# Patient Record
Sex: FEMALE | Race: WHITE
[De-identification: names, ages, dates, MRNs, and addresses within clinical notes are randomized per-mention and may not be internally consistent; named-entity substitution may affect disease eponyms.]

---

## 2019-02-22 ENCOUNTER — HOSPITAL ENCOUNTER (OUTPATIENT)
Dept: HOSPITAL 14 - H.OPSURG | Age: 59
Discharge: HOME | End: 2019-02-22
Attending: PODIATRIST
Payer: COMMERCIAL

## 2019-02-22 VITALS
OXYGEN SATURATION: 97 % | RESPIRATION RATE: 20 BRPM | HEART RATE: 87 BPM | TEMPERATURE: 98.1 F | SYSTOLIC BLOOD PRESSURE: 145 MMHG | DIASTOLIC BLOOD PRESSURE: 65 MMHG

## 2019-02-22 VITALS — BODY MASS INDEX: 26.2 KG/M2

## 2019-02-22 DIAGNOSIS — M20.12: Primary | ICD-10-CM

## 2019-02-22 DIAGNOSIS — E78.5: ICD-10-CM

## 2019-02-22 DIAGNOSIS — I10: ICD-10-CM

## 2019-02-22 DIAGNOSIS — M19.90: ICD-10-CM

## 2019-02-22 PROCEDURE — 88304 TISSUE EXAM BY PATHOLOGIST: CPT

## 2019-02-22 PROCEDURE — 28308 INCISION OF METATARSAL: CPT

## 2019-02-22 PROCEDURE — 97161 PT EVAL LOW COMPLEX 20 MIN: CPT

## 2019-02-22 PROCEDURE — 73630 X-RAY EXAM OF FOOT: CPT

## 2019-02-22 PROCEDURE — 28309 INCISION OF METATARSALS: CPT

## 2019-02-22 PROCEDURE — 88311 DECALCIFY TISSUE: CPT

## 2019-02-22 RX ADMIN — SODIUM CHLORIDE ONE ML: 9 INJECTION, SOLUTION INTRAVENOUS at 12:17

## 2019-02-22 RX ADMIN — SODIUM CHLORIDE ONE ML: 9 INJECTION, SOLUTION INTRAVENOUS at 10:05

## 2019-02-22 NOTE — CP.PCM.PN
Subjective





- Date & Time of Evaluation


Date of Evaluation: 02/22/19


Time of Evaluation: 07:57





- Subjective


Subjective: 


Podiatry progress note for Dr. Talley, 





57 y/o female patient with no significant PMHx was seen and evaluated in [facility] [place] for

Left foot surgery. Patient states her bunion has been present for many years, 

and has been painful for the past 2-3 years. Patient states all conservative 

treatment failed and hence she decided to go for a surgical procedure at this 

time. Patient confirms she last ate at 8:00 PM last night. Patient denies any 

adverse reactions to anesthesia in the past. 





Patient's medical and cardiac clearance are noted to be in the chart. 





PMHx: borderline HTN


Medications: Vitamins, Centrum


Allergies: Iron


Social History: denies alcohol or drug use








Objective





- Constitutional


Appears: Well, Non-toxic, No Acute Distress





- Head Exam


Head Exam: ATRAUMATIC, NORMOCEPHALIC





- Extremities Exam


Additional comments: 


Bilateral Lower Extremity Exam





VASC: DP and PT 2/4 bilaterally, CFT less than 3 seconds X 10, no edema, TG 

within normal limits, varicosities noted





NEURO: epicritic and protective sensation intact





DERM: erythema noted at the dorso-medial eminence, no open lesions, no clinical 

signs of infection





ORTHO: hallux abducto valgus of the left foot noted with prominent dorso-medial 

eminence, pain on palpation and with range of motion of the 1st MTPJ, pain with 

range of motion of the 2nd MTPJ, plantarflexed 2nd metatarsal  








- Neurological Exam


Neurological Exam: Alert, Awake, Oriented x3





- Psychiatric Exam


Psychiatric exam: Normal Affect, Normal Mood





Assessment and Plan





- Assessment and Plan (Free Text)


Assessment: 


57 y/o female seen and evaluated in [facility] [place] for L foot surgery





Plan: 


Pt was seen and examined in [facility] [place] 


Pt NPO status was confirmed


All pre-op testing and clearance in chart


Pt has exhausted all conservative treatment at this time and is opting for 

surgical intervention


Pt was explained procedure and post-operative course


All pt's questions were answered to satisfaction


No guarantees were made


Pt understands all risks, benefits and complications of procedure


Pt will follow-up with Dr. Carmen Talley within 1 week of surgery

## 2019-02-22 NOTE — CP.SDSHP
Same Day Surgery [facility]





- History


Proposed Procedure: Left foot 1st metatarsal osteotomy with 2nd metatarsal 

shortening with screw fixation


Pre-Op Diagnosis: Left foot Hallux Abducto Valgus Deformity





- Previous Medical/Surgical History


Pain: 4.Moderate Pain





- Allergies


Allergies: 


Allergies





iron Allergy (Verified 02/22/19 07:35)


   PALPITATIONS











- Physical Exam


Mental Status: Alert & Oriented x3


Neuro: WNL





- {Optional Preform as Required}


Integument: WNL





- Impression


Pt. Evaluated Today:Candidate for Anesthesia & Procedure: Yes





- Date & Time


Date: 02/22/19


Time: 07:57





Short Stay Discharge





- Short Stay Discharge


Admitting Diagnosis/Reason for Visit: M20.12


Disposition: HOME/ ROUTINE


Additional Instructions (Diet, Activity): 


-Patient in good/stable condition for discharge home


-Pt to resume medications per medical reconciliation


-Resume regular diet


-Please keep dressing clean, dry, & intact to surgical site


-Use plastic bag over bandage for showering


-Wear post op shoe at all times when ambulating


-Call clinic if you see signs of infection (redness, swelling, malodor)


-Please make an appointment to see Dr. Talley in office/clinic within 1 week for 

post-op check





Progress Note/Discharge Note with Instructions: 


- Patient evaluated bedside in recovery s/p L foot surgery


- After surgical procedure patient in NAD


- (+) Void, (+) Appetite


- Capillary refill time <3s and NVS intact.


- Patient denies complaints at this time.


- Post operative instructions and plan of care explained to patient at length.


- Patient. acknowledges verbal understanding.


- Patient stable for DC per podiatric surgery

## 2019-02-22 NOTE — RAD
Date of service: 



02/22/2019



PROCEDURE:  Left Foot Radiographs.



HISTORY:

 s/p left foot surgery 



COMPARISON:

None.



FINDINGS:



BONES:

Status post osteotomy distal 1st and 2nd metatarsal.  Status post 

bunionectomy.  No acute fracture. 



JOINTS:

Normal. 



SOFT TISSUES:

Normal. 



OTHER FINDINGS:

None.



IMPRESSION:

Osteotomy distal 1st and 2nd metatarsal.  Bunionectomy.  Otherwise 

unremarkable.

## 2019-02-22 NOTE — PCM.SURG1
Surgeon's Initial Post Op Note





- Surgeon's Notes


Surgeon: Dr. Talley DPM


Assistant: Dr. Ashton PGY3 Dr. Foster PGY1


Type of Anesthesia: General IV, Local


Anesthesia Administered By: Melia


Pre-Operative Diagnosis: Left foot hallux abductovalgus deformity, elongated 

second metatarsal


Operative Findings: see dictation.  20 cc 0.5% marcaine plain preop, 10cc intra 

op, 1cc dex phos intraop.  1 2.0 16mm screw, 1 1.5 14mm screw, 1 1.5 10mm screw


Post-Operative Diagnosis: same


Operation Performed: left hallux bunionectomy, second metatarsal weil osteomtomy

with screw fixation


Specimen/Specimens Removed: second metatarsal, first metatarsal


Estimated Blood Loss: EBL {In ML}: 5


Blood Products Given: N/A


Drains Used: No Drains


Post-Op Condition: Good


Date of Surgery/Procedure: 02/22/19


Time of Surgery/Procedure: 12:36

## 2019-02-25 NOTE — OP
PROCEDURE DATE:  02/22/2019



SURGEON:  Gaby Talley DPM



ASSISTANT:  Zulay Ashton DPM, PGY-3; Simon Foster DPM, PGY-1.



ANESTHETIST:  Jeannie Cárdenas MD.



ANESTHESIA:  General with local.



PREOPERATIVE DIAGNOSES:

1.  Painful hallux abductovalgus deformity of the left foot.

2.  Painful elongated and plantarflexed second metatarsal of the left foot.



PROCEDURE:

1.  Abdiel bunionectomy with screw fixation, left foot.

2.  Weil shortening osteotomy of the second metatarsal with screw fixation,

left foot.



INDICATIONS:  The patient is a 58-year-old female with the above mentioned

diagnoses.  The patient has exhausted all conservative treatment measures

at this time and now is requesting surgical intervention.  The patient

signed the consent after careful explanation of all risks, benefits,

complications, and alternatives for surgical procedure.  No guarantees were

given nor implied.  Then, 2 g of Ancef IV was given to the patient prior to

the procedure and NPO status was confirmed prior to taking the patient to

the operating room.



PREPARATION:  The patient was brought to the operating room and placed on

the operating room table in a supine position.  A well-padded pneumatic

ankle tourniquet was placed to the patient's left ankle.  After induction

of IV sedation, the patient received a total of 20 mL of 0.5% Marcaine

plain in a local block fashion to the patient's left foot.  Once local

anesthesia was achieved, the left foot was then prepped and draped in the

usual sterile manner and the procedure was began.  Esmarch bandage was

utilized to exsanguinate the patient's left foot.  The tourniquet was then

inflated to 250 mmHg at this time.



DESCRIPTION OF PROCEDURE:

1.  Abdiel bunionectomy with screw fixation on the left foot.  Our

attention was now directed to the dorsal aspect of the first metatarsal

head of the patient's left foot where an approximately 5-cm linear

longitudinal incision was made medial and parallel to the tendon of the

extensor hallucis longus and involved the contour of the deformity.  The

incision was now deepened through the subcutaneous tissues using sharp and

blunt dissection.  Care was taken to identify and retract all vital

neurovascular structures.  All bleeders were cauterized and ligated as

necessary.  At this time, an inverted L-type capsulotomy was performed over

the dorsal aspect of the first metatarsophalangeal joint.  The periosteal

and capsular structures were then carefully dissected free of their osseous

attachments and reflected medially and laterally, thus exposing the head of

the first metatarsal into the operative site.  Next, utilizing an

oscillating bone saw, the dorsal and medial prominences were resected and

passed from the operative field.  All roughened edges were then smoothed

down at this time.  Attention was then re-directed to the medial aspect of

the first metatarsal head where a through-and-through V-type osteotomy was

created in the metaphyseal region of the bone utilizing an oscillating bone

saw.  The apex of the osteotomy pointed distally with the arms pointing

proximal plantarly and proximal dorsally.  The dorsal arm was made longer

to accommodate for internal fixation.  Upon completion of the osteotomy,

the capital fragment was distracted and shifted laterally into a more

correct anatomic position and impacted upon the first metatarsal shaft.  At

this time, two 0.045 inch K-wires were driven from dorsal to plantar across

the osteotomy site to serve as a temporary fixation.  Following sequential

removal of the K-wires and following standard AO principles and techniques,

a Synthes 2.0 x 16 mm cortical screw was inserted and placed across the

osteotomy site with excellent compression achieved.  Next, a 0.045 inch

K-wire was then inserted across the osteotomy site on the lateral aspect of

the osteotomy and a 1.3 OrthoSorb pin was then placed across the drill hole

made by the K-wire once the K-wire was removed.  There appeared to be

excellent compression across the osteotomy site at this time with excellent

correction of the deformity noted.  The wound was then flushed with copious

amounts of sterile normal saline solution.  The periosteal and capsular

structures were then reapproximated and coapted using 2-0 and 3-0 Vicryl. 

The subcuticular tissue was reapproximated using 4-0 Vicryl and the skin

was reapproximated using 4-0 Monocryl suture in a continuous running suture

technique.



PROCEDURE #2:  Weil shortening osteotomy of the second metatarsal with

screw fixation of the left foot.



Our attention was now drawn to the second metatarsophalangeal joint.  A

linear incision was created just lateral to the second metatarsophalangeal

joint.  This was done to avoid postoperative contracture overlying the

second MTP joint and for minimal scar formation clinically.  The incision

was carried down to deeper plains paying careful attention to all

neurovascular structures that were retracted, ligated, and cauterized as

necessary.  The incision was deepened down to the level of the long

extensor tendon which was identified and retracted laterally.  Attention

was then drawn to the capsular and periosteal tissue and a sharp periosteal

and capsular incisions were made down to bone with taking care not to  score

the articular cartilage at the second metatarsal head.  The periosteal

tissue was sharply reflected from this area to expose the head and neck of

the second metatarsophalangeal joint.  At this time with the use a sagittal

saw, an osteotomy was made oriented dorsal distal to plantar proximal with

care being taken to make the cut in a parallel fashion  to the

weightbearing surface.  An oblique cut was then made in this orientation

from dorsal distal to proximal plantar without going completely through at

this time.  An additional osteotomy was made on the same orientation

approximately 3 mm proximal to this cut allowing us to remove a wedge of

bone at this time.  The osteotomy was then completed through-and-through

and the capital fragment was then transposed proximally to shorten the

metatarsal at this time.  It was temporarily fixated with two 0.045 inch

K-wires and oriented in a parallel fashion across the osteotomy site. 

Following sequential removal of the K-wires, two Synthes cortical screws

measuring 1.5 x 40 mm and the other measuring 1.5 x 10 mm were then

inserted and placed using standard AO principles and techniques. The surgical 
site was flushed

with copious amounts of sterile normal saline solution.  The capsular

tissue was reapproximated using 2-0 Vicryl.  The subcuticular tissue was

reapproximated using 4-0 Vicryl and the skin edges were reapproximated

using 4-0 Monocryl in a running continuous suture technique.  The incisions

were then injected with an additional 10 mL of 0.5% Marcaine plain. 

Steri-strips were applied to the incision site as well as Betadine-soaked

Adaptic, 4x4 gauze, and a Renato.  The foot was then dressed with a Keenan

compressive dressing consisting of Webril cast padding and Ace bandage.



POSTOPERATIVE CONDITION:  The patient tolerated the procedure and the

anesthesia well with no apparent complications or complaints.  The patient

was escorted from the OR to the recovery room with vital signs stable and

neurovascular structures intact to the patient's left foot.  The patient

will be on weightbearing to the heel with clutalexis.  The patient will

follow up in the office with Dr. Talley within one week.





__________________________________________

Zulay Ashton DPM





__________________________________________

Carmen Talley DPM





DD:  02/24/2019 12:26:36

DT:  02/24/2019 15:52:13

Job # [id number]

[id number]

## 2019-03-08 ENCOUNTER — HOSPITAL ENCOUNTER (OUTPATIENT)
Dept: HOSPITAL 31 - C.SDS | Age: 59
Discharge: HOME | End: 2019-03-08
Payer: COMMERCIAL

## 2019-03-08 VITALS — BODY MASS INDEX: 25.7 KG/M2

## 2019-03-08 VITALS
TEMPERATURE: 98 F | RESPIRATION RATE: 18 BRPM | SYSTOLIC BLOOD PRESSURE: 157 MMHG | HEART RATE: 70 BPM | DIASTOLIC BLOOD PRESSURE: 72 MMHG

## 2019-03-08 VITALS — OXYGEN SATURATION: 100 %

## 2019-03-08 DIAGNOSIS — T84.293A: Primary | ICD-10-CM

## 2019-03-08 PROCEDURE — 28308 INCISION OF METATARSAL: CPT

## 2019-03-08 PROCEDURE — 20680 REMOVAL OF IMPLANT DEEP: CPT

## 2019-03-08 NOTE — PCM.SURG1
Surgeon's Initial Post Op Note





- Surgeon's Notes


Surgeon: Dr. Oly Macdonald DPM


Assistant: Dr. ANNALISE Shepard DPM PGY-3


Type of Anesthesia: IV Sedation, Local


Anesthesia Administered By: Dr. Gasca


Pre-Operative Diagnosis: failure of hardware with displacement of osteotomy 1st 

metatarsal Left foot


Operative Findings: see operative note


Post-Operative Diagnosis: same


Operation Performed: 1) removal hardware 1st metatarsal left foot.  2) open 

reduction with internal fixation of capital fragment 1st metatarsal L foot


Specimen/Specimens Removed: screw 1st met left foot


Estimated Blood Loss: EBL {In ML}: 2


Blood Products Given: N/A


Drains Used: No Drains


Post-Op Condition: Good


Date of Surgery/Procedure: 03/08/19


Time of Surgery/Procedure: 09:00

## 2019-03-11 NOTE — OP
PROCEDURE DATE:  03/08/2019



SURGEON:  Oly Macdonald DPM



ASSISTANT:  Gloria Shepard DPM, PGY-3



ANESTHETIST:  Kenisha Lainez MD



ANESTHESIA:  IV with local.



PREOPERATIVE DIAGNOSIS:  Failure of hardware with displacement of

osteotomy, first metatarsal left foot.



POSTOPERATIVE DIAGNOSIS:  Failure of hardware with displacement of

osteotomy, first metatarsal left foot.



PROCEDURES:

1.  Removal of hardware, first metatarsal left foot.

2.  Open reduction with internal fixation of capital fragment, first

metatarsal left foot.



INDICATIONS:  The patient is a 58-year-old female with the above-mentioned

diagnosis.  Of note, the patient did have a bunion surgery of the left foot

two weeks prior to today's date; however, subsequent radiographs in the

office have shown displacement of the hardware at the osteotomy site.  The

decision was made to bring the patient back to the operating room to repair

the fixation.  The patient has exhausted all conservative treatment

measures now and requires surgical intervention.  The patient signed the

consent after careful explanation of all risks, benefits, complications,

and alternatives for surgical procedure.  No guarantees were given nor

implied.  N.p.o. status was confirmed prior to taking the patient into the

operating room.



PREPARATION:  The patient was brought into the operating room and placed on

the operating room table in a supine position.  A well-padded pneumatic

ankle tourniquet was placed to the patient's left ankle with plenty of

Webril cast padding.  After induction of IV sedation, the patient received

a total of 20 mL of 1:1 mixture consisting of 0.25% Marcaine plain with 1%

lidocaine plain in local block fashion to the patient's left foot.  Once

local anesthesia was achieved, the left foot was then prepped and draped in

the usual manner.  Esmarch bandage was utilized to exsanguinate the

patient's left foot, and the tourniquet was then inflated to 250 mmHg and

the procedure began.



PROCEDURE #1:  Removal of hardware, first metatarsal left foot:  Our

attention was directed to the patient's dorsal medial aspect of the left

foot over the first metatarsophalangeal joint where a previous local

well-coapted incision could be appreciated.  A #15 blade was used to remove

all the Monocryl sutures from the incision site.  Next, the tissue layers

were carefully dissected taking care to avoid all vital neurovascular

structures.  All bleeders were cauterized and ligated as necessary.  Next,

the sutures within the capsular tissue were then removed.  The capsular

tissue incision was inverted out, was then crushing at this time down to

the level of bone.  At this time, a 2.0 Synthes screw could be appreciated

at the osteotomy site and was removed and passed from the operative field. 

The osteotomy site did not appear to be fused at this time. The surgical site 
was flushed with copious amounts of sterile normal

saline solution.



PROCEDURE #2:  Open reduction with internal fixation of capital fragment

first metatarsal left foot:  At this time, the capital fragment of the

previous osteotomy of the metatarsal head was then repositioned and

realigned into a corrected more anatomic position.  The capital fragment

was then impacted into the proximal aspect of the metatarsal. 

Intraoperative fluoroscopy was utilized to make sure correct apposition of

the capital fragment at this time which appeared to be excellent.  Next, a

2.0 fully threaded Steinmann pin was then inserted across the osteotomy

site from proximal medial plantar to dorsal distal lateral across the

osteotomy site with excellent compression achieved.  A plier cutter was

then utilized to cut the Steinmann pin flush to the bone.  Range of motion was

now checked at the first metatarsophalangeal joint which appeared to be

excellent at this time.  The surgical site was flushed with copious amounts

of sterile normal saline solution.  The capsular tissue was reapproximated

using 2-0 and 3-0 Vicryl.  The subcuticular tissue was reapproximated using

a 4-0 Vicryl, and skin edges were reapproximated using a 4-0 Prolene

suture.  The surgical site was dressed with Betadine-soaked Adaptic. 

Additional 10 mL of 0.25% Marcaine plain was injected to the incision site.

The incision was then dressed with 4x4s, Isiah, and a well-padded

below-knee bivalve fiberglass cast was placed at the patient's left lower

extremities with the foot dorsiflexed to 90 degrees in relation to the leg.



POSTOPERATIVE CONDITION:  The patient tolerated the procedure well with no

apparent complications or complaints.  The patient was escorted from the OR

to the recovery room where vital signs stable and neurovascular structures

intact to the patient's left foot.  The patient was instructed on strictly

nonweightbearing.  Postoperatively, the patient will follow up with Dr. Macdonald in the office within one week.







__________________________________________

Gloria Shepard DPM





__________________________________________

Oly Macdonald DPM





DD:  03/08/2019 15:01:21

DT:  03/08/2019 22:01:14

Job # [id number]

DINA